# Patient Record
Sex: MALE | Race: WHITE | NOT HISPANIC OR LATINO | Employment: FULL TIME | URBAN - METROPOLITAN AREA
[De-identification: names, ages, dates, MRNs, and addresses within clinical notes are randomized per-mention and may not be internally consistent; named-entity substitution may affect disease eponyms.]

---

## 2024-07-01 ENCOUNTER — OFFICE VISIT (OUTPATIENT)
Dept: FAMILY MEDICINE | Facility: CLINIC | Age: 34
End: 2024-07-01
Payer: COMMERCIAL

## 2024-07-01 VITALS
RESPIRATION RATE: 20 BRPM | HEIGHT: 76 IN | TEMPERATURE: 98.9 F | BODY MASS INDEX: 35.02 KG/M2 | OXYGEN SATURATION: 97 % | HEART RATE: 86 BPM | WEIGHT: 287.6 LBS | DIASTOLIC BLOOD PRESSURE: 86 MMHG | SYSTOLIC BLOOD PRESSURE: 144 MMHG

## 2024-07-01 DIAGNOSIS — Z13.220 LIPID SCREENING: ICD-10-CM

## 2024-07-01 DIAGNOSIS — Z82.49 FAMILY HISTORY OF HEART DISEASE: ICD-10-CM

## 2024-07-01 DIAGNOSIS — Z13.1 SCREENING FOR DIABETES MELLITUS: ICD-10-CM

## 2024-07-01 DIAGNOSIS — Z11.59 NEED FOR HEPATITIS C SCREENING TEST: ICD-10-CM

## 2024-07-01 DIAGNOSIS — Z00.00 ROUTINE GENERAL MEDICAL EXAMINATION AT A HEALTH CARE FACILITY: Primary | ICD-10-CM

## 2024-07-01 DIAGNOSIS — R03.0 ELEVATED BLOOD PRESSURE READING WITHOUT DIAGNOSIS OF HYPERTENSION: ICD-10-CM

## 2024-07-01 DIAGNOSIS — Z11.4 SCREENING FOR HIV (HUMAN IMMUNODEFICIENCY VIRUS): ICD-10-CM

## 2024-07-01 PROCEDURE — 99385 PREV VISIT NEW AGE 18-39: CPT | Performed by: FAMILY MEDICINE

## 2024-07-01 SDOH — HEALTH STABILITY: PHYSICAL HEALTH: ON AVERAGE, HOW MANY DAYS PER WEEK DO YOU ENGAGE IN MODERATE TO STRENUOUS EXERCISE (LIKE A BRISK WALK)?: 2 DAYS

## 2024-07-01 ASSESSMENT — PAIN SCALES - GENERAL: PAINLEVEL: NO PAIN (0)

## 2024-07-01 ASSESSMENT — SOCIAL DETERMINANTS OF HEALTH (SDOH): HOW OFTEN DO YOU GET TOGETHER WITH FRIENDS OR RELATIVES?: ONCE A WEEK

## 2024-07-01 NOTE — PROGRESS NOTES
"Preventive Care Visit  [facility]  Abdelrahman Up MD, Family Medicine  Jul 1, 2024      Assessment & Plan     Routine general medical examination at a health care facility  Patient was advised on recommended screening and preventive health recommendations.  He verbalized understanding and agreed to the plans below.   He deferred covid vaccine to another day.    Family history of heart disease  Patient will obtain more information about this and let the care team know soon.  Possible tests that may be needed could include echocardiogram, cardiac event monitor, cardiac MRI or consult with cardiology.    Elevated blood pressure reading without diagnosis of hypertension  Patient was advised BP slightoly above normal range today.  No previous diagnosis of HTN.  Patient was advised risks of persistent and untreated BP elevation.  Patient was advised low salt diet.  Recheck BP 2-3 weeks with provider. If still >140/90, discuss treatment.     Lipid screening  - Lipid panel reflex to direct LDL Fasting    Screening for diabetes mellitus  - Glucose    Screening for HIV (human immunodeficiency virus)  - HIV Antigen Antibody Combo    Need for hepatitis C screening test  - Hepatitis C Screen Reflex to HCV RNA Quant and Genotype      Patient has been advised of split billing requirements and indicates understanding: Yes        BMI  Estimated body mass index is 34.78 kg/m  as calculated from the following:    Height as of this encounter: 1.937 m (6' 4.25\").    Weight as of this encounter: 130.5 kg (287 lb 9.6 oz).   Weight management plan: Discussed healthy diet and exercise guidelines    Counseling  Appropriate preventive services were discussed with this patient, including applicable screening as appropriate for fall prevention, nutrition, physical activity, Tobacco-use cessation, weight loss and cognition.  Checklist reviewing preventive services available has been given to the " "patient.  Reviewed patient's diet, addressing concerns and/or questions.   He is at risk for lack of exercise and has been provided with information to increase physical activity for the benefit of his well-being.   The patient was instructed to see the dentist every 6 months.   He is at risk for psychosocial distress and has been provided with information to reduce risk.       Patient Instructions   Patient Education    Be consistent with low salt, low trans fat and low saturated fat diet.  Eat food rich in omega-3-fatty acids as you tolerate. (salmon, olive oil)  Eat 5 cups of vegetables, fruits and whole grains per day.  Limit starchy food (white rice, white bread, white pasta, white potatoes) to less than a cup per meal.  Minimize sweets, junk food and fastfood. Limit soda beverages to one serving per day; best to avoid it altogether though.    Exercise: moderate intensity sustained for at least 30 mins per episode, goal of 150 mins per week at least  Combine cardiovascular and resistance exercises.  These exercise recommendations are in addition to your daily activity at work or home.  Work on losing weight.    Blood tests: be fasting for 8 hours or more on the next follow up.    Get covid19 vaccine updated soon. You deferred it to another day.       Preventive Care Advice   This is general advice we often give to help people stay healthy. Your care team may have specific advice just for you. Please talk to your care team about your own preventive care needs.  Lifestyle  Exercise at least 150 minutes each week (30 minutes a day, 5 days a week).  Do muscle strengthening activities 2 days a week. These help control your weight and prevent disease.  No smoking.  Wear sunscreen to prevent skin cancer.  Have your home tested for radon every 2 to 5 years. Radon is a colorless, odorless gas that can harm your lungs. To learn more, go to [email or web address].[email or web address] and search for \"Radon in Homes.\"  Keep guns unloaded and " "locked up in a safe place like a safe or gun vault, or, use a gun lock and hide the keys. Always lock away bullets separately. To learn more, visit dps.[place].gov and search for \"safe gun storage.\"  Nutrition  Eat 5 or more servings of fruits and vegetables each day.  Try wheat bread, brown rice and whole grain pasta (instead of white bread, rice, and pasta).  Get enough calcium and vitamin D. Check the label on foods and aim for 100% of the RDA (recommended daily allowance).  Regular exams  Have a dental exam and cleaning every 6 months.  See your health care team every year to talk about:  Any changes in your health.  Any medicines your care team has prescribed.  Preventive care, family planning, and ways to prevent chronic diseases.  Shots (vaccines)   HPV shots (up to age 26), if you've never had them before.  Hepatitis B shots (up to age 59), if you've never had them before.  COVID-19 shot: Get this shot when it's due.  Flu shot: Get a flu shot every year.  Tetanus shot: Get a tetanus shot every 10 years.  Pneumococcal, hepatitis A, and RSV shots: Ask your care team if you need these based on your risk.  Shingles shot (for age 50 and up).  General health tests  Diabetes screening:  Starting at age 35, Get screened for diabetes at least every 3 years.  If you are younger than age 35, ask your care team if you should be screened for diabetes.  Cholesterol test: At age 39, start having a cholesterol test every 5 years, or more often if advised.  Bone density scan (DEXA): At age 50, ask your care team if you should have this scan for osteoporosis (brittle bones).  Hepatitis C: Get tested at least once in your life.  Abdominal aortic aneurysm screening: Talk to your doctor about having this screening if you:  Have ever smoked; and  Are biologically male; and  Are between the ages of 65 and 75.  STIs (sexually transmitted infections)  Before age 24: Ask your care team if you should be screened for STIs.  After age 24: " Get screened for STIs if you're at risk. You are at risk for STIs (including HIV) if:  You are sexually active with more than one person.  You don't use condoms every time.  You or a partner was diagnosed with a sexually transmitted infection.  If you are at risk for HIV, ask about PrEP medicine to prevent HIV.  Get tested for HIV at least once in your life, whether you are at risk for HIV or not.  Cancer screening tests  Cervical cancer screening: If you have a cervix, begin getting regular cervical cancer screening tests at age 21. Most people who have regular screenings with normal results can stop after age 65. Talk about this with your provider.  Breast cancer scan (mammogram): If you've ever had breasts, begin having regular mammograms starting at age 40. This is a scan to check for breast cancer.  Colon cancer screening: It is important to start screening for colon cancer at age 45.  Have a colonoscopy test every 10 years (or more often if you're at risk) Or, ask your provider about stool tests like a FIT test every year or Cologuard test every 3 years.  To learn more about your testing options, visit: www.[employer]/208268.pdf.  For help making a decision, visit: marti/lk90352.  Prostate cancer screening test: If you have a prostate and are age 55 to 69, ask your provider if you would benefit from a yearly prostate cancer screening test.  Lung cancer screening: If you are a current or former smoker age 50 to 80, ask your care team if ongoing lung cancer screenings are right for you.  For informational purposes only. Not to replace the advice of your health care provider. Copyright   2023 [facility]. All rights reserved. Clinically reviewed by the [facility] Transitions Program. [employer] 892999 - REV 04/24.       Subjective   Sheldon is a 34 year old, presenting for the following:  Physical        7/1/2024     4:18 PM   Additional Questions   Roomed by Tootie ARREDONDO MA   Accompanied by  "self         2024     4:18 PM   Patient Reported Additional Medications   Patient reports taking the following new medications none        Health Care Directive  Patient does not have a Health Care Directive or Living Will: Discussed advance care planning with patient; however, patient declined at this time.    HPI    Brother  of a heart condition that is \"hereditary\". He does not know specific condition. Will need to get info from other fam members.  Patient denies chest pain, LIRA or palpitations.          2024   General Health   How would you rate your overall physical health? Good   Feel stress (tense, anxious, or unable to sleep) Only a little      (!) STRESS CONCERN      2024   Nutrition   Three or more servings of calcium each day? Yes   Diet: Regular (no restrictions)   How many servings of fruit and vegetables per day? (!) 0-1   How many sweetened beverages each day? 0-1            2024   Exercise   Days per week of moderate/strenous exercise 2 days      (!) EXERCISE CONCERN      2024   Social Factors   Frequency of gathering with friends or relatives Once a week   Worry food won't last until get money to buy more No   Food not last or not have enough money for food? No   Do you have housing? (Housing is defined as stable permanent housing and does not include staying ouside in a car, in a tent, in an abandoned building, in an overnight shelter, or couch-surfing.) Yes   Are you worried about losing your housing? No   Lack of transportation? No   Unable to get utilities (heat,electricity)? No            2024   Dental   Dentist two times every year? (!) NO            2024   TB Screening   Were you born outside of the [place]? No            Today's PHQ-2 Score:       2024     4:05 PM   PHQ-2 (  [employer])   Q1: Little interest or pleasure in doing things 0   Q2: Feeling down, depressed or hopeless 0   PHQ-2 Score 0   Q1: Little interest or pleasure in doing things Not at all " "  Q2: Feeling down, depressed or hopeless Not at all   PHQ-2 Score 0           7/1/2024   Substance Use   Alcohol more than 3/day or more than 7/wk No   Do you use any other substances recreationally? No        Social History     Tobacco Use    Smoking status: Former     Types: Cigarettes    Smokeless tobacco: Current    Tobacco comments:     Zyn pouches   Vaping Use    Vaping status: Never Used             7/1/2024   One time HIV Screening   Previous HIV test? No          7/1/2024   STI Screening   New sexual partner(s) since last STI/HIV test? No            7/1/2024   Contraception/Family Planning   Questions about contraception or family planning No           Reviewed and updated as needed this visit by Provider     Meds                No past medical history on file.  No past surgical history on file.  There is no problem list on file for this patient.    No past surgical history on file.    Social History     Tobacco Use    Smoking status: Former     Types: Cigarettes    Smokeless tobacco: Current    Tobacco comments:     Zyn pouches   Substance Use Topics    Alcohol use: Not on file     No family history on file.      No current outpatient medications on file.     No Known Allergies      Review of Systems  Constitutional, HEENT, cardiovascular, pulmonary, GI, , musculoskeletal, neuro, skin, endocrine and psych systems are negative, except as otherwise noted.     Objective    Exam  BP (!) 144/86   Pulse 86   Temp 98.9  F (37.2  C) (Tympanic)   Resp 20   Ht 1.937 m (6' 4.25\")   Wt 130.5 kg (287 lb 9.6 oz)   SpO2 97%   BMI 34.78 kg/m     Estimated body mass index is 34.78 kg/m  as calculated from the following:    Height as of this encounter: 1.937 m (6' 4.25\").    Weight as of this encounter: 130.5 kg (287 lb 9.6 oz).    Physical Exam  GENERAL APPEARANCE: obese, ambulatory w/o assist, alert and no distress  EYES: pink conj, no icterus, PERRL, EOMI  HENT: ear canals and TM's normal, nose and mouth " without ulcers or lesions, oropharynx clear and oral mucous membranes moist  NECK: no adenopathy, no asymmetry, masses, or scars and thyroid normal to palpation  RESP: lungs clear to auscultation - no rales, rhonchi or wheezes  CV: regular rates and rhythm, normal S1 S2, no S3 or S4, no murmur, click or rub, no peripheral edema and peripheral pulses strong  ABDOMEN: soft, nontender, no hepatosplenomegaly, no masses and bowel sounds normal  RECTAL: deferred  MS: no musculoskeletal defects are noted and gait is age appropriate without ataxia  SKIN: no suspicious lesions or rashes  NEURO: Normal strength and tone, sensory exam grossly normal, mentation intact and speech normal         Signed Electronically by: Abdelrahman Up MD

## 2024-07-01 NOTE — PATIENT INSTRUCTIONS
"Patient Education     Be consistent with low salt, low trans fat and low saturated fat diet.  Eat food rich in omega-3-fatty acids as you tolerate. (salmon, olive oil)  Eat 5 cups of vegetables, fruits and whole grains per day.  Limit starchy food (white rice, white bread, white pasta, white potatoes) to less than a cup per meal.  Minimize sweets, junk food and fastfood. Limit soda beverages to one serving per day; best to avoid it altogether though.    Exercise: moderate intensity sustained for at least 30 mins per episode, goal of 150 mins per week at least  Combine cardiovascular and resistance exercises.  These exercise recommendations are in addition to your daily activity at work or home.  Work on losing weight.    Blood tests: be fasting for 8 hours or more on the next follow up.    Get covid19 vaccine updated soon. You deferred it to another day.       Preventive Care Advice   This is general advice we often give to help people stay healthy. Your care team may have specific advice just for you. Please talk to your care team about your own preventive care needs.  Lifestyle  Exercise at least 150 minutes each week (30 minutes a day, 5 days a week).  Do muscle strengthening activities 2 days a week. These help control your weight and prevent disease.  No smoking.  Wear sunscreen to prevent skin cancer.  Have your home tested for radon every 2 to 5 years. Radon is a colorless, odorless gas that can harm your lungs. To learn more, go to www.health.[email or web address].mn. and search for \"Radon in Homes.\"  Keep guns unloaded and locked up in a safe place like a safe or gun vault, or, use a gun lock and hide the keys. Always lock away bullets separately. To learn more, visit [employer].mn.gov and search for \"safe gun storage.\"  Nutrition  Eat 5 or more servings of fruits and vegetables each day.  Try wheat bread, brown rice and whole grain pasta (instead of white bread, rice, and pasta).  Get enough calcium and vitamin D. Check the " label on foods and aim for 100% of the RDA (recommended daily allowance).  Regular exams  Have a dental exam and cleaning every 6 months.  See your health care team every year to talk about:  Any changes in your health.  Any medicines your care team has prescribed.  Preventive care, family planning, and ways to prevent chronic diseases.  Shots (vaccines)   HPV shots (up to age 26), if you've never had them before.  Hepatitis B shots (up to age 59), if you've never had them before.  COVID-19 shot: Get this shot when it's due.  Flu shot: Get a flu shot every year.  Tetanus shot: Get a tetanus shot every 10 years.  Pneumococcal, hepatitis A, and RSV shots: Ask your care team if you need these based on your risk.  Shingles shot (for age 50 and up).  General health tests  Diabetes screening:  Starting at age 35, Get screened for diabetes at least every 3 years.  If you are younger than age 35, ask your care team if you should be screened for diabetes.  Cholesterol test: At age 39, start having a cholesterol test every 5 years, or more often if advised.  Bone density scan (DEXA): At age 50, ask your care team if you should have this scan for osteoporosis (brittle bones).  Hepatitis C: Get tested at least once in your life.  Abdominal aortic aneurysm screening: Talk to your doctor about having this screening if you:  Have ever smoked; and  Are biologically male; and  Are between the ages of 65 and 75.  STIs (sexually transmitted infections)  Before age 24: Ask your care team if you should be screened for STIs.  After age 24: Get screened for STIs if you're at risk. You are at risk for STIs (including HIV) if:  You are sexually active with more than one person.  You don't use condoms every time.  You or a partner was diagnosed with a sexually transmitted infection.  If you are at risk for HIV, ask about PrEP medicine to prevent HIV.  Get tested for HIV at least once in your life, whether you are at risk for HIV or  not.  Cancer screening tests  Cervical cancer screening: If you have a cervix, begin getting regular cervical cancer screening tests at age 21. Most people who have regular screenings with normal results can stop after age 65. Talk about this with your provider.  Breast cancer scan (mammogram): If you've ever had breasts, begin having regular mammograms starting at age 40. This is a scan to check for breast cancer.  Colon cancer screening: It is important to start screening for colon cancer at age 45.  Have a colonoscopy test every 10 years (or more often if you're at risk) Or, ask your provider about stool tests like a FIT test every year or Cologuard test every 3 years.  To learn more about your testing options, visit: www.Netflix/426781.pdf.  For help making a decision, visit: marti/bw81310.  Prostate cancer screening test: If you have a prostate and are age 55 to 69, ask your provider if you would benefit from a yearly prostate cancer screening test.  Lung cancer screening: If you are a current or former smoker age 50 to 80, ask your care team if ongoing lung cancer screenings are right for you.  For informational purposes only. Not to replace the advice of your health care provider. Copyright   2023 [employer]. All rights reserved. Clinically reviewed by the [facility] Transitions Program. [employer] 193189 - REV 04/24.

## 2024-07-15 ENCOUNTER — OFFICE VISIT (OUTPATIENT)
Dept: FAMILY MEDICINE | Facility: CLINIC | Age: 34
End: 2024-07-15
Payer: COMMERCIAL

## 2024-07-15 VITALS
SYSTOLIC BLOOD PRESSURE: 150 MMHG | DIASTOLIC BLOOD PRESSURE: 96 MMHG | HEART RATE: 80 BPM | RESPIRATION RATE: 20 BRPM | OXYGEN SATURATION: 100 % | WEIGHT: 288.5 LBS | HEIGHT: 76 IN | BODY MASS INDEX: 35.13 KG/M2 | TEMPERATURE: 98.6 F

## 2024-07-15 DIAGNOSIS — I10 UNCONTROLLED HYPERTENSION: Primary | ICD-10-CM

## 2024-07-15 DIAGNOSIS — E78.2 MIXED HYPERLIPIDEMIA: ICD-10-CM

## 2024-07-15 DIAGNOSIS — R06.83 SNORING: ICD-10-CM

## 2024-07-15 DIAGNOSIS — Z11.59 NEED FOR HEPATITIS C SCREENING TEST: ICD-10-CM

## 2024-07-15 DIAGNOSIS — Z13.220 LIPID SCREENING: ICD-10-CM

## 2024-07-15 DIAGNOSIS — Z11.4 SCREENING FOR HIV (HUMAN IMMUNODEFICIENCY VIRUS): ICD-10-CM

## 2024-07-15 LAB
ANION GAP SERPL CALCULATED.3IONS-SCNC: 15 MMOL/L (ref 7–15)
BUN SERPL-MCNC: 12.6 MG/DL (ref 6–20)
CALCIUM SERPL-MCNC: 9.9 MG/DL (ref 8.6–10)
CHLORIDE SERPL-SCNC: 102 MMOL/L (ref 98–107)
CHOLEST SERPL-MCNC: 236 MG/DL
CREAT SERPL-MCNC: 0.77 MG/DL (ref 0.67–1.17)
EGFRCR SERPLBLD CKD-EPI 2021: >90 ML/MIN/1.73M2
ERYTHROCYTE [DISTWIDTH] IN BLOOD BY AUTOMATED COUNT: 11.7 % (ref 10–15)
FASTING STATUS PATIENT QL REPORTED: YES
FASTING STATUS PATIENT QL REPORTED: YES
GLUCOSE SERPL-MCNC: 94 MG/DL (ref 70–99)
HCO3 SERPL-SCNC: 25 MMOL/L (ref 22–29)
HCT VFR BLD AUTO: 46.2 % (ref 40–53)
HDLC SERPL-MCNC: 62 MG/DL
HGB BLD-MCNC: 15.1 G/DL (ref 13.3–17.7)
LDLC SERPL CALC-MCNC: 163 MG/DL
MCH RBC QN AUTO: 30 PG (ref 26.5–33)
MCHC RBC AUTO-ENTMCNC: 32.7 G/DL (ref 31.5–36.5)
MCV RBC AUTO: 92 FL (ref 78–100)
NONHDLC SERPL-MCNC: 174 MG/DL
PLATELET # BLD AUTO: 227 10E3/UL (ref 150–450)
POTASSIUM SERPL-SCNC: 4 MMOL/L (ref 3.4–5.3)
RBC # BLD AUTO: 5.03 10E6/UL (ref 4.4–5.9)
SODIUM SERPL-SCNC: 142 MMOL/L (ref 135–145)
TRIGL SERPL-MCNC: 55 MG/DL
TSH SERPL DL<=0.005 MIU/L-ACNC: 3.02 UIU/ML (ref 0.3–4.2)
WBC # BLD AUTO: 8.6 10E3/UL (ref 4–11)

## 2024-07-15 PROCEDURE — 80061 LIPID PANEL: CPT | Performed by: FAMILY MEDICINE

## 2024-07-15 PROCEDURE — 85027 COMPLETE CBC AUTOMATED: CPT | Performed by: FAMILY MEDICINE

## 2024-07-15 PROCEDURE — 86803 HEPATITIS C AB TEST: CPT | Performed by: FAMILY MEDICINE

## 2024-07-15 PROCEDURE — 87086 URINE CULTURE/COLONY COUNT: CPT | Performed by: FAMILY MEDICINE

## 2024-07-15 PROCEDURE — 99214 OFFICE O/P EST MOD 30 MIN: CPT | Performed by: FAMILY MEDICINE

## 2024-07-15 PROCEDURE — 87389 HIV-1 AG W/HIV-1&-2 AB AG IA: CPT | Performed by: FAMILY MEDICINE

## 2024-07-15 PROCEDURE — 80048 BASIC METABOLIC PNL TOTAL CA: CPT | Performed by: FAMILY MEDICINE

## 2024-07-15 PROCEDURE — 84443 ASSAY THYROID STIM HORMONE: CPT | Performed by: FAMILY MEDICINE

## 2024-07-15 PROCEDURE — 93000 ELECTROCARDIOGRAM COMPLETE: CPT | Performed by: FAMILY MEDICINE

## 2024-07-15 PROCEDURE — 36415 COLL VENOUS BLD VENIPUNCTURE: CPT | Performed by: FAMILY MEDICINE

## 2024-07-15 RX ORDER — LISINOPRIL 5 MG/1
5 TABLET ORAL DAILY
Qty: 90 TABLET | Refills: 0 | Status: SHIPPED | OUTPATIENT
Start: 2024-07-15 | End: 2024-09-25

## 2024-07-15 ASSESSMENT — PAIN SCALES - GENERAL: PAINLEVEL: NO PAIN (0)

## 2024-07-15 NOTE — PROGRESS NOTES
Assessment & Plan     Uncontrolled hypertension  Discussed causes, course, complications and treatment of condition.  Low salt, low fat diet.   Exercise as tolerated 30 mins per day 3 days a week to start with.  Counseled on different meds; patient agreed to above recommended med.  Take meds as prescribed; call if with side effects.   Nurse visit in 2 weeks for recheck.  Return precautions given.   - Basic metabolic panel  - CBC with platelets  - TSH with free T4 reflex  - Urine Culture  - lisinopril (ZESTRIL) 5 MG tablet  Dispense: 90 tablet; Refill: 0  - EKG 12-lead complete w/read - Clinics  - Adult Sleep Eval & Management  Referral    Snoring  Advised possible ROBERT. Sleep apnea if present may also contribute to high BP.  - Adult Sleep Eval & Management  Referral              Patient Instructions   Your blood pressure today is uncontrolled.  Due to this medical treatment should be started to prevent complications.  Start lisinopril 5 mg today.  Blood, urine, and EKG tests are ordered as baseline.  Low salt, low fat diet. Exercise as tolerated 30 mins per day 3 days a week.  Take meds as prescribed; call if with side effects.     Referral to sleep clinic has been signed. Schedulers will call you in the next 3-5 business days.       Subjective   Sheldon is a 34 year old, presenting for the following health issues:  Hypertension (Follow up) and Sleep Problem        7/15/2024     3:09 PM   Additional Questions   Roomed by Tootie ARREDONDO MA   Accompanied by Self         7/15/2024     3:09 PM   Patient Reported Additional Medications   Patient reports taking the following new medications Men's one a day multivitamin     History of Present Illness       Hypertension: He presents for follow up of hypertension.  He does not check blood pressure  regularly outside of the clinic. Outside blood pressures have been over 140/90. He follows a low salt diet.     He eats 2-3 servings of fruits and vegetables  "daily.He consumes 0 sweetened beverage(s) daily.He exercises with enough effort to increase his heart rate 10 to 19 minutes per day.  He exercises with enough effort to increase his heart rate 3 or less days per week.   He is taking medications regularly.       Patient said his brother had atherosclerosis and passed away of a heart event 10 years ago.    Patient is fasting today for blood test.    Denies chest pain, dyspnea, HA, BOV, dizziness or urinary changes.     Patient asked for sleep clinic referral as his spouse to ld him he snores. Patient denies significant daytime fatigue.    Review of Systems  Constitutional, HEENT, cardiovascular, pulmonary, GI, , musculoskeletal, neuro, skin, endocrine and psych systems are negative, except as otherwise noted.      Objective    BP (!) 150/96   Pulse 80   Temp 98.6  F (37  C) (Tympanic)   Resp 20   Ht 1.937 m (6' 4.25\")   Wt 130.9 kg (288 lb 8 oz)   SpO2 100%   BMI 34.89 kg/m    Body mass index is 34.89 kg/m .  Physical Exam   GENERAL: alert and no distress, ambulatory w/o assist  NECK: no tenderness, no adenopathy,  Thyroid not enlarged  RESP: lungs clear to auscultation - no rales, no rhonchi, no wheezes  CV: regular rates and rhythm, no murmur  MS: no edema  SKIN: no suspicious lesions, no rashes      EKG today showed sinus rhythm with no ST-T changes to suspect ischemia, no LVH by voltage criteria, no previous tracing to compare with    Signed Electronically by: Abdelrahman Up MD    "

## 2024-07-15 NOTE — PATIENT INSTRUCTIONS
Your blood pressure today is uncontrolled.  Due to this medical treatment should be started to prevent complications.  Start lisinopril 5 mg today.  Blood, urine, and EKG tests are ordered as baseline.  Low salt, low fat diet. Exercise as tolerated 30 mins per day 3 days a week.  Take meds as prescribed; call if with side effects.     Referral to sleep clinic has been signed. Schedulers will call you in the next 3-5 business days.

## 2024-07-16 LAB
HCV AB SERPL QL IA: NONREACTIVE
HIV 1+2 AB+HIV1 P24 AG SERPL QL IA: NONREACTIVE

## 2024-07-17 ENCOUNTER — MYC MEDICAL ADVICE (OUTPATIENT)
Dept: FAMILY MEDICINE | Facility: CLINIC | Age: 34
End: 2024-07-17
Payer: COMMERCIAL

## 2024-07-17 DIAGNOSIS — I10 UNCONTROLLED HYPERTENSION: Primary | ICD-10-CM

## 2024-07-17 DIAGNOSIS — E78.2 MIXED HYPERLIPIDEMIA: ICD-10-CM

## 2024-07-17 DIAGNOSIS — Z82.49 FAMILY HISTORY OF HEART DISEASE: ICD-10-CM

## 2024-07-17 LAB — BACTERIA UR CULT: NO GROWTH

## 2024-07-18 ENCOUNTER — OFFICE VISIT (OUTPATIENT)
Dept: SLEEP MEDICINE | Facility: CLINIC | Age: 34
End: 2024-07-18
Attending: FAMILY MEDICINE
Payer: COMMERCIAL

## 2024-07-18 VITALS
HEIGHT: 76 IN | RESPIRATION RATE: 18 BRPM | BODY MASS INDEX: 34.46 KG/M2 | WEIGHT: 283 LBS | HEART RATE: 89 BPM | OXYGEN SATURATION: 98 % | DIASTOLIC BLOOD PRESSURE: 81 MMHG | SYSTOLIC BLOOD PRESSURE: 144 MMHG

## 2024-07-18 DIAGNOSIS — R06.83 SNORING: ICD-10-CM

## 2024-07-18 DIAGNOSIS — R06.81 WITNESSED EPISODE OF APNEA: Primary | ICD-10-CM

## 2024-07-18 DIAGNOSIS — I10 UNCONTROLLED HYPERTENSION: ICD-10-CM

## 2024-07-18 PROCEDURE — 99203 OFFICE O/P NEW LOW 30 MIN: CPT | Performed by: INTERNAL MEDICINE

## 2024-07-18 ASSESSMENT — SLEEP AND FATIGUE QUESTIONNAIRES
HOW LIKELY ARE YOU TO NOD OFF OR FALL ASLEEP WHILE SITTING AND TALKING TO SOMEONE: WOULD NEVER DOZE
HOW LIKELY ARE YOU TO NOD OFF OR FALL ASLEEP WHILE SITTING INACTIVE IN A PUBLIC PLACE: WOULD NEVER DOZE
HOW LIKELY ARE YOU TO NOD OFF OR FALL ASLEEP WHEN YOU ARE A PASSENGER IN A CAR FOR AN HOUR WITHOUT A BREAK: SLIGHT CHANCE OF DOZING
HOW LIKELY ARE YOU TO NOD OFF OR FALL ASLEEP WHILE SITTING QUIETLY AFTER LUNCH WITHOUT ALCOHOL: SLIGHT CHANCE OF DOZING
HOW LIKELY ARE YOU TO NOD OFF OR FALL ASLEEP WHILE SITTING AND READING: SLIGHT CHANCE OF DOZING
HOW LIKELY ARE YOU TO NOD OFF OR FALL ASLEEP WHILE LYING DOWN TO REST IN THE AFTERNOON WHEN CIRCUMSTANCES PERMIT: MODERATE CHANCE OF DOZING
HOW LIKELY ARE YOU TO NOD OFF OR FALL ASLEEP WHILE WATCHING TV: SLIGHT CHANCE OF DOZING
HOW LIKELY ARE YOU TO NOD OFF OR FALL ASLEEP IN A CAR, WHILE STOPPED FOR A FEW MINUTES IN TRAFFIC: WOULD NEVER DOZE

## 2024-07-18 NOTE — PROGRESS NOTES
[facility] [address] [address]  Phone: [phone]    Patient:  Sheldon Velez, Date of birth 1990  Date of Visit:  07/18/2024  Referring Provider Abdelrahman Up             [facility] [place]   Outpatient Sleep Medicine Consultation  July 18, 2024      Name: Sheldon Velez MRN# [id number]   Age: 34 year old YOB: 1990     Date of Consultation: July 18, 2024  Consultation is requested by: Abdelrahman Up MD  [address] [place] [address] Abdelrahman pU  Primary care provider: No Ref-Primary, Physician         Reason for Sleep Consult:     Sheldon Velez is a 34 year old male for complaints of reported snoring, interrupted and poor sleep for over 10 years with difficult to control hypertension.         Assessment and Plan:   34 years old male with underlying history of hypertension, class II obesity is seen for his longstanding history of snoring, interrupted and poor sleep and subjective daytime tiredness.  He has been diagnosed with hypertension and his primary healthcare provider is concerned for untreated sleep disordered breathing as a contributing factor towards his difficult to control hypertension.  Summary Sleep Diagnoses:    Sleep disordered breathing. Kush's history and clinical presentation is highly suggestive with a high pretest probability for obstructive sleep apnea.  In the absence of any significant cardiovascular or pulmonary comorbidities a type III home sleep test is a reasonable diagnostic study to determine the presence and severity of obstructive sleep apnea.  For sleep.  He does complain of subjective tiredness during the daytime and is not content with the quality of his sleep at night with intermittent arousals.  Insomnia.  He does report of some difficulty in falling asleep which is worse on Sunday nights.  Usually it can take  him between 30 minutes to an hour to fall asleep during his workweek which is contributing to its his chronically restricted sleep pattern.  There is some compensation over the weekend which is resulting in increasing sleep latency particularly on Sunday nights.      Comorbid Diagnoses:    Hypertension.  Class II obesity.    Summary Recommendations:    NOx T3 home sleep test to screen for the presence and determine the severity of obstructive sleep apnea.  Sleep hygiene is recommended.  Patient is advised to maintain a consistent bedtime which is closer to his intrinsic circadian rhythm.  His usual sleep time is around 11 PM.  He is advised not to operate heavy machinery or drive when feeling drowsy.    Summary Counseling:  See instructions    Counseling included a comprehensive review of diagnostic and therapeutic strategies as well as risks of inadequate therapy.  Educational materials provided in instructions.             History of Present Illness:     Sheldon Velez is a 34 year old male with history of hypertension has been sent by his primary healthcare provider with concern for untreated obstructive sleep apnea/sleep disordered breathing as a likely contributor to 2 words is difficult to manage hypertension. Kush is  with a 16-month-old child.  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  During the workweek he attempts to go to bed around 9:30 PM.  He has been struggling with laying in his bed sometimes up to an hour before falling asleep.  During the night he does snore as per his wife who has witnessed pauses in his breathing.  He usually wakes up between 1-2 times during the night tossing or turning.  He wakes up with an alarm around 5:30 in the morning and on most days does not feel well rested.  Over the weekend he will go to bed around 11 PM and able to take him less than 30 minutes to fall asleep.  He wakes up without an  alarm around 7:30 AM.  He does take 1-2 naps in a week usually over the weekend which usually last between 60 to 90 minutes.    Kush does have occasional episodes of hypnopompic hallucinations but denies symptoms of sleep paralysis, cataplexy.  He denies symptoms of orthopnea or paroxysmal nocturnal dyspnea.    SLEEP-WAKE SCHEDULE:     Sheldon Velez     -Describes themself as a morning person;  prefer to go to sleep at 11:00 PM and wake up at 7:30 AM.     -Naps 1-2 times per week for  minutes, feels refreshed after naps; takes no inadvertant naps.     Sheldon Velez    -ON WEEKDAYS, goes to sleep at 9:30 PM during the week; awakens  5:30 AM with an alarm; falls asleep in 60 minutes; has difficulty falling asleep.     -ON WEEKENDS, goes to sleep at 11:00 PM.  He wakes up at 7:30 AM without an alarm; falls asleep in 30 minutes.       -Awakens 1-2 times a night for 5 minutes before falling back to sleep; awakens to uncertain reasons with social jet lag of 1 hours.      He does not feel rested in the morning.    SCALES       SLEEP APNEA: [place] score: 7         INSOMNIA:  Insomnia severity score: 13       SLEEPINESS: [place] sleepiness scale: 6 [normal < 11]   Drowsy driving/near accidents: Denies.  Consequences: None      SLEEP COMPLAINTS:  Cardio-respiratory    Snoring-: Snores loudly as per his wife, nightly/week  Dyspnea-: Denies  Morning headaches or confusion: Denies  Coexisting Lung disease: Denies    Coexisting Heart disease: Denies    Does patient have a bed partner: Sleeps with his wife  Has bed partner been sleeping separately because of snoring denies            RLS Screen: When you try to relax in the evening or sleep at  night, do you ever have unpleasant, restless feelings in your  legs that can be relieved by walking or movement?  Denies    Periodic limb movement: Denies    Narcolepsy: Denies sudden urges of sleep attacks  Cataplexy: Denies   Sleep paralysis: Denies    Hallucinations:  Denies       Sleep Behaviors:  Leg symptoms/movements: Denies  Motor restlessness: Denies  Night terrors: Denies  Bruxism: Denies  Automatic behaviors: None    Other subjective complaints:  Anxiety or rumination: Denies  Pain and discomfort at  night: Denies  Waking up with heart pounding or racing denies  GERD or aspiration: Denies         Parasomnia:   NREM: Denies recurrent persistent confusional arousal, night eating, sleep walking or sleep terrors   REM: Denies dream enactment; injuries          Medications:     Current Outpatient Medications   Medication Sig Dispense Refill    lisinopril (ZESTRIL) 5 MG tablet Take 1 tablet (5 mg) by mouth daily 90 tablet 0     No current facility-administered medications for this visit.        No Known Allergies         Past Medical History:     Does not need 02 supplement at night   No past medical history on file.          Past Surgical History:    Previous upper airway surgery: Denies  No past surgical history on file.         Social History:     Social History     Tobacco Use    Smoking status: Former     Types: Cigarettes    Smokeless tobacco: Current    Tobacco comments:     Alexys valencia   Substance Use Topics    Alcohol use: Not on file         Chemical History:     Tobacco: Quit smoking 2 years ago (7 pack years)      Uses 2 cups/day of coffee. Last caffeine intake is usually before noon    Supplements for wakefulness: Does not use any supplements.    EtOH: 5-6 beers   Recreational Drugs: Denies    Psych Hx:   No known psychiatric history  Current dangers to self or others:none           Family History:     No family history on file.     Sleep Family Hx:        RLS-none   ROBERT -2 brothers have diagnosis of obstructive sleep apnea  Insomnia -unknown  Parasomnia -none         Review of Systems:     A complete 10 point review of systems was negative other than HPI or as commented below:   Sheldon Velez has gained 0-5 pounds in the last year.      CONSTITUTIONAL:  "NEGATIVE for weight gain/loss, fever, chills, sweats or night sweats, drug allergies.  EYES: NEGATIVE for changes in vision, blind spots, double vision.  ENT: NEGATIVE for ear pain, sore throat, sinus pain, post-nasal drip, runny nose, bloody nose  CARDIAC: NEGATIVE for fast heartbeats or fluttering in chest, chest pain or pressure, breathlessness when lying flat, swollen legs or swollen feet.  NEUROLOGIC: NEGATIVE headaches, weakness or numbness in the arms or legs.  DERMATOLOGIC: NEGATIVE for rashes, new moles or change in mole(s)  PULMONARY: NEGATIVE SOB at rest, SOB with activity, dry cough, productive cough, coughing up blood, wheezing or whistling when breathing.    GASTROINTESTINAL: NEGATIVE for nausea or vomitting, loose or watery stools, fat or grease in stools, constipation, abdominal pain, bowel movements black in color or blood noted.  GENITOURINARY: NEGATIVE for pain during urination, blood in urine, urinating more frequently than usual, irregular menstrual periods.  MUSCULOSKELETAL: NEGATIVE for muscle pain, bone or joint pain, swollen joints.  ENDOCRINE: NEGATIVE for increased thirst or urination, diabetes.  LYMPHATIC: NEGATIVE for swollen lymph nodes, lumps or bumps in the breasts or nipple discharge.         Physical Examination:     BP (!) 145/84   Pulse 89   Resp 18   Ht 1.93 m (6' 4\")   Wt 128.4 kg (283 lb)   SpO2 98%   BMI 34.45 kg/m    GENERAL APPEARANCE: Well-groomed appearance, appropriately engaged throughout the interview,alert, oriented, no apparent distress  EYES: Eyes grossly normal to inspection,lids and lashes within normal limits   HENT: Mallampatti 4, Carriz[place] class IV; mandibular profile mild micrognathia; dentition normal  NECK: Supple, no adenopathy,no asymmetry,   RESP: Breathing comfortably, no apparent use of accessory respiratory muscles, no audible wheezes  MUSCULOSKELETAL: Normal extremities; no deformities noted  NEURO: Normal gait; intact cranial nerves; grossly " "normal strength  PSYCH: Normal mentation; normal affect   SKIN: No hyperpigmented skin changes over exposed areas            Data: All pertinent previous laboratory data reviewed     No results found for: \"PH\", \"PHARTERIAL\", \"PO2\", \"[id number]\", \"SAT\", \"PCO2\", \"HCO3\", \"BASEEXCESS\", \"SAL\", \"BEB\"  Lab Results   Component Value Date    TSH 3.02 07/15/2024     Lab Results   Component Value Date    GLC 94 07/15/2024     Lab Results   Component Value Date    HGB 15.1 07/15/2024     Lab Results   Component Value Date    BUN 12.6 07/15/2024    CR 0.77 07/15/2024     Copy to: No Ref-Primary, Physician      Rene Figueroa MD 7/18/2024     Total of 37 minutes of time was spent with patient, this includes the interview and exam, and review of the chart/labs/imaging/sleep study/PAP therapy and pertinent clinical data on 07/18/2024. This also includes (greater than 50%) time spent counseling and coordinating care.              "

## 2024-07-18 NOTE — PATIENT INSTRUCTIONS
Your BMI is Body mass index is 34.45 kg/m .    What is BMI?  Body mass index (BMI) is one way to tell whether you are at a healthy weight, overweight, or obese. It measures your weight in relation to your height.  A BMI of 18.5 to 24.9 is in the healthy range. A person with a BMI of 25 to 29.9 is considered overweight, and someone with a BMI of 30 or greater is considered obese.  Another way to find out if you are at risk for health problems caused by overweight and obesity is to measure your waist. If you are a woman and your waist is more than 35 inches, or if you are a man and your waist is more than 40 inches, your risk of disease may be higher.  More than two-thirds of American adults are considered overweight or obese. Being overweight or obese increases the risk for further weight gain.  Excess weight may lead to heart disease and diabetes. Creating and following plans for healthy eating and physical activity may help you improve your health.    Methods for maintaining or losing weight.  Weight control is part of healthy lifestyle and includes exercise, emotional health, and healthy eating habits.  Careful eating habits lifelong is the mainstay of weight control.  Though there are significant health benefits from weight loss, long-term weight loss with diet alone may be very difficult to achieve- studies show long-term success with dietary management in less than 10% of people. Attaining a healthy weight may be especially difficult to achieve in those with severe obesity. In some cases, medications, devices and surgical management might be considered.    What can you do?  If you are overweight or obese and are interested in methods for weight loss, you should discuss this with your provider. In addition, we recommend that you review healthy life styles and methods for weight loss available through the National Institutes of Health patient information sites:  "  http://win.niddk.nih.gov/publications/index.htm      Your blood pressure was checked while you were in clinic today.  Please read the guidelines below about what these numbers mean and what you should do about them.  Your systolic blood pressure is the top number.  This is the pressure when the heart is pumping.  Your diastolic blood pressure is the bottom number.  This is the pressure in between beats.  If your systolic blood pressure is less than 120 and your diastolic blood pressure is less than 80, then your blood pressure is normal. There is nothing more that you need to do about it  If your systolic blood pressure is 120-139 or your diastolic blood pressure is 80-89, your blood pressure may be higher than it should be.  You should have your blood pressure re-checked within a year by a primary care provider.  If your systolic blood pressure is 140 or greater or your diastolic blood pressure is 90 or greater, you may have high blood pressure.  High blood pressure is treatable, but if left untreated over time it can put you at risk for heart attack, stroke, or kidney failure.  You should have your blood pressure re-checked by a primary care provider within the next four weeks.            MY TREATMENT INFORMATION FOR SLEEP APNEA-  Sheldon Velez    DOCTOR : Rene Figueroa MD    Am I having a sleep study at a sleep center?  --->Due to normal delays, you will be contacted within 2-4 weeks to schedule    Am I having a home sleep study?  --->Watch the video for the device you are using:    -/drop off device-   https://www.[employer].com/watch?v=yGGFBdELGhk    -Disposable device sent out require phone/computer application-   https://www.[employer].com/watch?v=BCce_vbiwxE      Frequently asked questions:  1. What is Obstructive Sleep Apnea (ROBERT)? ROBERT is the most common type of sleep apnea. Apnea means, \"without breath.\"  Apnea is most often caused by narrowing or collapse of the upper airway as muscles relax " during sleep.   Almost everyone has occasional apneas. Most people with sleep apnea have had brief interruptions at night frequently for many years.  The severity of sleep apnea is related to how frequent and severe the events are.   2. What are the consequences of ROBERT? Symptoms include: feeling sleepy during the day, snoring loudly, gasping or stopping of breathing, trouble sleeping, and occasionally morning headaches or heartburn at night.  Sleepiness can be serious and even increase the risk of falling asleep while driving. Other health consequences may include development of high blood pressure and other cardiovascular disease in persons who are susceptible. Untreated ROBERT  can contribute to heart disease, stroke and diabetes.   3. What are the treatment options? In most situations, sleep apnea is a lifelong disease that must be managed with daily therapy. Medications are not effective for sleep apnea and surgery is generally not considered until other therapies have been tried. Your treatment is your choice . Continuous Positive Airway (CPAP) works right away and is the therapy that is effective in nearly everyone. An oral device to hold your jaw forward is usually the next most reliable option. Other options include postioning devices (to keep you off your back), weight loss, and surgery including a tongue pacing device. There is more detail about some of these options below.  4. Are my sleep studies covered by insurance? Although we will request verification of coverage, we advise you also check in advance of the study to ensure there is coverage.    Important tips for those choosing CPAP and similar devices  REMEMBER-IF YOU RECEIVE A CALL FROM  [phone]-->IT IS TO SETUP A DEVICE  For new devices, sign up for device LEVI to monitor your device for your followup visits  We encourage you to utilize the [employer] levi or website ( https://[email or web address] ) to monitor your therapy progress and share the  data with your healthcare team when you discuss your sleep apnea.                                                    Know your equipment:  CPAP is continuous positive airway pressure that prevents obstructive sleep apnea by keeping the throat from collapsing while you are sleeping. In most cases, the device is  smart  and can slowly self-adjusts if your throat collapses and keeps a record every day of how well you are treated-this information is available to you and your care team.  BPAP is bilevel positive airway pressure that keeps your throat open and also assists each breath with a pressure boost to maintain adequate breathing.  Special kinds of BPAP are used in patients who have inadequate breathing from lung or heart disease. In most cases, the device is  smart  and can slowly self-adjusts to assist breathing. Like CPAP, the device keeps a record of how well you are treated.  Your mask is your connection to the device. You get to choose what feels most comfortable and the staff will help to make sure if fits. Here: are some examples of the different masks that are available: Magnetic mask aids may assist with use but there are safety issues that should be addressed when considering with magnets* ( see end of discussion).       Key points to remember on your journey with sleep apnea:  Sleep study.  PAP devices often need to be adjusted during a sleep study to show that they are effective and adjusted right.  Good tips to remember: Try wearing just the mask during a quiet time during the day so your body adapts to wearing it. A humidifier is recommended for comfort in most cases to prevent drying of your nose and throat. Allergy medication from your provider may help you if you are having nasal congestion.  Getting settled-in. It takes more than one night for most of us to get used to wearing a mask. Try wearing just the mask during a quiet time during the day so your body adapts to wearing it. A humidifier is  recommended for comfort in most cases. Our team will work with you carefully on the first day and will be in contact within 4 days and again at 2 and 4 weeks for advice and remote device adjustments. Your therapy is evaluated by the device each day.   Use it every night. The more you are able to sleep naturally for 7-8 hours, the more likely you will have good sleep and to prevent health risks or symptoms from sleep apnea. Even if you use it 4 hours it helps. Occasionally all of us are unable to use a medical therapy, in sleep apnea, it is not dangerous to miss one night.   Communicate. Call our skilled team on the number provided on the first day if your visit for problems that make it difficult to wear the device. Over 2 out of 3 patients can learn to wear the device long-term with help from our team. Remember to call our team or your sleep providers if you are unable to wear the device as we may have other solutions for those who cannot adapt to mask CPAP therapy. It is recommended that you sleep your sleep provider within the first 3 months and yearly after that if you are not having problems.   Use it for your health. We encourage use of CPAP masks during daytime quiet periods to allow your face and brain to adapt to the sensation of CPAP so that it will be a more natural sensation to awaken to at night or during naps. This can be very useful during the first few weeks or months of adapting to CPAP though it does not help medically to wear CPAP during wakefulness and  should not be used as a strategy just to meet guidelines.  Take care of your equipment. Make sure you clean your mask and tubing using directions every day and that your filter and mask are replaced as recommended or if they are not working.     *Masks with magnets:  Updated Contraindications  Masks with magnetic components are contraindicated for use by patients where they, or anyone in close physical contact while using the mask, have the  following:   Active medical implants that interact with magnets (i.e., pacemakers, implantable cardioverter defibrillators (ICD), neurostimulators, cerebrospinal fluid (CSF) shunts, insulin/infusion pumps)   Metallic implants/objects containing ferromagnetic material (i.e., aneurysm clips/flow disruption devices, embolic coils, stents, valves, electrodes, implants to restore hearing or balance with implanted magnets, ocular implants, metallic splinters in the eye)  Updated Warning  Keep the mask magnets at a safe distance of at least 6 inches (150 mm) away from implants or medical devices that may be adversely affected by magnetic interference. This warning applies to you or anyone in close physical contact with your mask. The magnets are in the frame and lower headgear clips, with a magnetic field strength of up to 400mT. When worn, they connect to secure the mask but may inadvertently detach while asleep.  Implants/medical devices, including those listed within contraindications, may be adversely affected if they change function under external magnetic fields or contain ferromagnetic materials that attract/repel to magnetic fields (some metallic implants, e.g., contact lenses with metal, dental implants, metallic cranial plates, screws, hyacinth hole covers, and bone substitute devices). Consult your physician and  of your implant / other medical device for information on the potential adverse effects of magnetic fields.    BESIDES CPAP, WHAT OTHER THERAPIES ARE THERE?    Positioning Device  Positioning devices are generally used when sleep apnea is mild and only occurs on your back.This example shows a pillow that straps around the waist. It may be appropriate for those whose sleep study shows milder sleep apnea that occurs primarily when lying flat on one's back. Preliminary studies have shown benefit but effectiveness at home may need to be verified by a home sleep test. These devices are generally not  covered by medical insurance.  Examples of devices that maintain sleeping on the back to prevent snoring and mild sleep apnea.    Belt type body positioner  http://[employer]/    Electronic reminder  http://nightshifttherapy.com/            Oral Appliance  What is oral appliance therapy?  An oral appliance device fits on your teeth at night like a retainer used after having braces. The device is made by a specialized dentist and requires several visits over 1-2 months before a manufactured device is made to fit your teeth and is adjusted to prevent your sleep apnea. Once an oral device is working properly, snoring should be improved. A home sleep test may be recommended at that time if to determine whether the sleep apnea is adequately treated.       Some things to remember:  -Oral devices are often, but not always, covered by your medical insurance. Be sure to check with your insurance provider.   -If you are referred for oral therapy, you will be given a list of specialized dentists to consider or you may choose to visit the Web site of the American Academy of Dental Sleep Medicine  -Oral devices are less likely to work if you have severe sleep apnea or are extremely overweight.     More detailed information  An oral appliance is a small acrylic device that fits over the upper and lower teeth  (similar to a retainer or a mouth guard). This device slightly moves jaw forward, which moves the base of the tongue forward, opens the airway, improves breathing for effective treat snoring and obstructive sleep apnea in perhaps 7 out of 10 people .  The best working devices are custom-made by a dental device  after a mold is made of the teeth 1, 2, 3.  When is an oral appliance indicated?  Oral appliance therapy is recommended as a first-line treatment for patients with primary snoring, mild sleep apnea, and for patients with moderate sleep apnea who prefer appliance therapy to use of CPAP4, 5. Severity of  sleep apnea is determined by sleep testing and is based on the number of respiratory events per hour of sleep.   How successful is oral appliance therapy?  The success rate of oral appliance therapy in patients with mild sleep apnea is 75-80% while in patients with moderate sleep apnea it is 50-70%. The chance of success in patients with severe sleep apnea is 40-50%. The research also shows that oral appliances have a beneficial effect on the cardiovascular health of ROBERT patients at the same magnitude as CPAP therapy7.  Oral appliances should be a second-line treatment in cases of severe sleep apnea, but if not completely successful then a combination therapy utilizing CPAP plus oral appliance therapy may be effective. Oral appliances tend to be effective in a broad range of patients although studies show that the patients who have the highest success are females, younger patients, those with milder disease, and less severe obesity. 3, 6.   Finding a dentist that practices dental sleep medicine  Specific training is available through the American Academy of Dental Sleep Medicine for dentists interested in working in the field of sleep. To find a dentist who is educated in the field of sleep and the use of oral appliances, near you, visit the Web site of the American Academy of Dental Sleep Medicine.    References  1. Jonny, et al. Objectively measured vs self-reported compliance during oral appliance therapy for sleep-disordered breathing. Chest 2013; 144(5): 1308-1755.  2. Anne et al. Objective measurement of compliance during oral appliance therapy for sleep-disordered breathing. Thorax 2013; 68(1): 91-96.  3. Brenda, et al. Mandibular advancement devices in 620 men and women with ROBERT and snoring: tolerability and predictors of treatment success. Chest 2004; 125: 0881-2296.  4. Jameson et al. Oral appliances for snoring and ROBERT: a review. Sleep 2006; 29: 244-262.  5. Tanisha et al. Oral  appliance treatment for ROBERT: an update. J Clin Sleep Med 2014; 10(2): 215-227.  6. Bi et al. Predictors of OSAH treatment outcome. J Dent Res 2007; 86: 9734-1360.      Weight Loss:   Your Body mass index is 34.45 kg/m .    Being overweight does not necessarily mean you will have health consequences.  Those who have BMI over 35 or over 27 with existing medical conditions carries greater risk.   Weight loss decreases severity of sleep apnea in most people with obesity. For those with mild obesity who have developed snoring with weight gain, even 15-30 pound weight loss can improve and occasionally milder eliminate sleep apnea.  Structured and life-long dietary and health habits are necessary to lose weight and keep healthier weight levels.     The Comprehensive Weight loss program offers all aspects of weight loss strategies including two Non-Surgical Weight Loss Programs: Medical Weight Management and our 24 Week Healthy Lifestyle Program:    Medical Weight Management: You will meet with a Medical Weight Management Provider, as well as a Registered Dietician. The program may include medication therapy, dietary education, recommended exercise and physical therapy programs, monthly support group meetings, and possible psychological counseling. Follow up visits with the provider or dietician are scheduled based on your progress and needs.    24 Week Healthy Lifestyle Program: This unique program is designed to give you the support of weekly appointments and activities thru a 24-week period. It may include all of the components of the basic program (above), with the addition of 11 individual Health  Visits, 24-week access to the [employer] website for over 700 online classes, and monthly support group meetings. This program has an out-of-pocket expense of $499 to cover the items that can not be billed to insurance (health coaches and [employer] access), and is non-refundable/non-transferable (you may be able to  use a Health Savings Account; ask your HSA provider). There may be an optional meal replacement plan prescribed as well.   Surgical management achieves meaningful long-term weight loss and improvement in health risks in most patients with more severe obesity.      Sleep Apnea Surgery:    Surgery for obstructive sleep apnea is considered generally only when other therapies fail to work. Surgery may be discussed with you if you are having a difficult time tolerating CPAP and or when there is an abnormal structure that requires surgical correction.  Nose and throat surgeries often enlarge the airway to prevent collapse.  Most of these surgeries create pain for 1-2 weeks and up to half of the most common surgeries are not effective throughout life.  You should carefully discuss the benefits and drawbacks to surgery with your sleep provider and surgeon to determine if it is the best solution for you.   More information  Surgery for ROBERT is directed at areas that are responsible for narrowing or complete obstruction of the airway during sleep.  There are a wide range of procedures available to enlarge and/or stabilize the airway to prevent blockage of breathing in the three major areas where it can occur: the palate, tongue, and nasal regions.  Successful surgical treatment depends on the accurate identification of the factors responsible for obstructive sleep apnea in each person.  A personalized approach is required because there is no single treatment that works well for everyone.  Because of anatomic variation, consultation with an examination by a sleep surgeon is a critical first step in determining what surgical options are best for each patient.  In some cases, examination during sedation may be recommended in order to guide the selection of procedures.  Patients will be counseled about risks and benefits as well as the typical recovery course after surgery. Surgery is typically not a cure for a person s ROBERT.   However, surgery will often significantly improve one s ROBERT severity (termed  success rate ).  Even in the absence of a cure, surgery will decrease the cardiovascular risk associated with OSA7; improve overall quality of life8 (sleepiness, functionality, sleep quality, etc).      Palate Procedures:  Patients with ROBERT often have narrowing of their airway in the region of their tonsils and uvula.  The goals of palate procedures are to widen the airway in this region as well as to help the tissues resist collapse.  Modern palate procedure techniques focus on tissue conservation and soft tissue rearrangement, rather than tissue removal.  Often the uvula is preserved in this procedure. Residual sleep apnea is common in patient after pharyngoplasty with an average reduction in sleep apnea events of 33%2.      Tongue Procedures:  ExamWhile patients are awake, the muscles that surround the throat are active and keep this region open for breathing. These muscles relax during sleep, allowing the tongue and other structures to collapse and block breathing.  There are several different tongue procedures available.  Selection of a tongue base procedure depends on characteristics seen on physical exam.  Generally, procedures are aimed at removing bulky tissues in this area or preventing the back of the tongue from falling back during sleep.  Success rates for tongue surgery range from 50-62%3.    Hypoglossal Nerve Stimulation:  Hypoglossal nerve stimulation has recently received approval from the [place] Food and Drug Administration for the treatment of obstructive sleep apnea.  This is based on research showing that the system was safe and effective in treating sleep apnea6.  Results showed that the median AHI score decreased 68%, from 29.3 to 9.0. This therapy uses an implant system that senses breathing patterns and delivers mild stimulation to airway muscles, which keeps the airway open during sleep.  The system  consists of three fully implanted components: a small generator (similar in size to a pacemaker), a breathing sensor, and a stimulation lead.  Using a small handheld remote, a patient turns the therapy on before bed and off upon awakening.    Candidates for this device must be greater than 18 years of age, have moderate to severe obstructive sleep apnea with less than 25% central events  (AHI between 15-65), BMI less than 35, have tried CPAP/oral appliance for at least 8 weeks without success, and have appropriate upper airway anatomy (determined by a sleep endoscopy performed by Dr. Remy Nunn or Dr. Sanjay Abdi).    Nasal Procedures:  Nasal obstruction can interfere with nasal breathing during the day and night.  Studies have shown that relief of nasal obstruction can improve the ability of some patients to tolerate positive airway pressure therapy for obstructive sleep apnea1.  Treatment options include medications such as nasal saline, topical corticosteroid and antihistamine sprays, and oral medications such as antihistamines or decongestants. Non-surgical treatments can include external nasal dilators for selected patients. If these are not successful by themselves, surgery can improve the nasal airway either alone or in combination with these other options.        Combination Procedures:  Combination of surgical procedures and other treatments may be recommended, particularly if patients have more than one area of narrowing or persistent positional disease.  The success rate of combination surgery ranges from 66-80%2,3.    References  Elizabeth JOHNSON. The Role of the Nose in Snoring and Obstructive Sleep Apnoea: An Update.  Eur Arch Otorhinolaryngol. 2011; 268: 1365-73.   Cindy SM; Nikky JA; Diamond JR; Pallanch JF; Sandhya SWAN; Ceci SG; Jennifer LEONARD. Surgical modifications of the upper airway for obstructive sleep apnea in adults: a systematic review and meta-analysis. SLEEP 2010;33(10):1954-7382. Dov BATES  Hypopharyngeal surgery in obstructive sleep apnea: an evidence-based medicine review.  Arch Otolaryngol Head Neck Surg. 2006 Feb;132(2):206-13.  Phillip YH1, Janna Y, Francisco RANJEET. The efficacy of anatomically based multilevel surgery for obstructive sleep apnea. Otolaryngol Head Neck Surg. 2003 Oct;129(4):327-35.  Dov ECKERT, Goldberg A. Hypopharyngeal Surgery in Obstructive Sleep Apnea: An Evidence-Based Medicine Review. Arch Otolaryngol Head Neck Surg. 2006 Feb;132(2):206-13.  Galen POOLE et al. Upper-Airway Stimulation for Obstructive Sleep Apnea.  N Engl J Med. 2014 Jan 9;370(2):139-49.  Maribell Y et al. Increased Incidence of Cardiovascular Disease in Middle-aged Men with Obstructive Sleep Apnea. Am J Respir Crit Care Med; 2002 166: 159-165  Estevez EM et al. Studying Life Effects and Effectiveness of Palatopharyngoplasty (SLEEP) study: Subjective Outcomes of Isolated Uvulopalatopharyngoplasty. Otolaryngol Head Neck Surg. 2011; 144: 623-631.        WHAT IF I ONLY HAVE SNORING?    Mandibular advancement devices, lateral sleep positioning, long-term weight loss and treatment of nasal allergies have been shown to improve snoring.  Exercising tongue muscles with a game (https://[employer].Latest Medical/us/levi/Stamp.it-reduce-snoring/ix5785160734) or stimulating the tongue during the day with a device (https://doi.org/10.3390/ibk40234766) have improved snoring in some individuals.  https://www.[employer].PrairieSmarts/  https://www.sleepfoundation.org/best-anti-snoring-mouthpieces-and-mouthguards    Remember to Drive Safe... Drive Alive     Sleep health profoundly affects your health, mood, and your safety.  Thirty three percent of the population (one in three of us) is not getting enough sleep and many have a sleep disorder. Not getting enough sleep or having an untreated / undertreated sleep condition may make us sleepy without even knowing it. In fact, our driving could be dramatically impaired due to our sleep health. As your provider, here  are some things I would like you to know about driving:     Here are some warning signs for impairment and dangerous drowsy driving:              -Having been awake more than 16 hours               -Looking tired               -Eyelid drooping              -Head nodding (it could be too late at this point)              -Driving for more than 30 minutes     Some things you could do to make the driving safer if you are experiencing some drowsiness:              -Stop driving and rest              -Call for transportation              -Make sure your sleep disorder is adequately treated     Some things that have been shown NOT to work when experiencing drowsiness while driving:              -Turning on the radio              -Opening windows              -Eating any  distracting  /  entertaining  foods (e.g., sunflower seeds, candy, or any other)              -Talking on the phone      Your decision may not only impact your life, but also the life of others. Please, remember to drive safe for yourself and all of us.

## 2024-07-30 ENCOUNTER — MYC MEDICAL ADVICE (OUTPATIENT)
Dept: FAMILY MEDICINE | Facility: CLINIC | Age: 34
End: 2024-07-30
Payer: COMMERCIAL

## 2024-07-30 NOTE — TELEPHONE ENCOUNTER
Dr Up  You pended an order for CT calcium screening and the pt isnt able to schedule it until it is signed.     Thank you  Magdiel Richard RN

## 2024-08-06 ENCOUNTER — TELEPHONE (OUTPATIENT)
Dept: FAMILY MEDICINE | Facility: CLINIC | Age: 34
End: 2024-08-06

## 2024-08-06 ENCOUNTER — ALLIED HEALTH/NURSE VISIT (OUTPATIENT)
Dept: FAMILY MEDICINE | Facility: CLINIC | Age: 34
End: 2024-08-06
Payer: COMMERCIAL

## 2024-08-06 VITALS — HEART RATE: 72 BPM | DIASTOLIC BLOOD PRESSURE: 78 MMHG | SYSTOLIC BLOOD PRESSURE: 132 MMHG

## 2024-08-06 DIAGNOSIS — I10 UNCONTROLLED HYPERTENSION: Primary | ICD-10-CM

## 2024-08-06 PROCEDURE — 99207 PR NO CHARGE NURSE ONLY: CPT

## 2024-08-06 NOTE — NURSING NOTE
Sheldon Velez is a 34 year old year old patient who comes in today for a Blood Pressure check because of new medication. Started on lisinopril 5mg daily on 7/15/24.  Vital Signs as repeated by RN: BP- 144/80, P- 72. Rechecked after 3 minutes: 132/78.  Patient is taking medication as prescribed  Patient is tolerating medications well.  Patient is monitoring Blood Pressure at home, says that he checked it this morning; thinks it was 133/76.  Current complaints: Since starting lisinopril, notices that his feet are cold at night when he takes socks off and gets ready for bed. Disposition: Routing to ordering provider for review/recommendation.    Damaris Shell RN  [facility]

## 2024-08-07 NOTE — TELEPHONE ENCOUNTER
Continue monitoring blood pressure outside clinic.    Follow-up with PCP for further concerns/medication questions.  Jacquie Rosas, DNP    
Pt was notified; says that he'll wait to discuss medication question at next visit if no improvement. Writer also assisted pt with request to schedule follow up visit in November for labs, BP, and to discuss results of coronary calcium CT scan.    Damaris Shell RN  [facility]  
Sheldon Velez is a 34 year old year old patient who comes in today for a Blood Pressure check because of new medication. Started on lisinopril 5mg daily on 7/15/24.  Vital Signs as repeated by RN: BP- 144/80, P- 72. Rechecked after 3 minutes: 132/78.  Patient is taking medication as prescribed  Patient is tolerating medications well.  Patient is monitoring Blood Pressure at home, says that he checked it this morning; thinks it was 133/76.  Current complaints: Since starting lisinopril, notices that his feet are cold at night when he takes socks off and gets ready for bed. Disposition: Routing to ordering provider for review/recommendation.     Damaris Shell RN  [facility]     
95

## 2024-08-29 ENCOUNTER — HOSPITAL ENCOUNTER (OUTPATIENT)
Dept: CT IMAGING | Facility: CLINIC | Age: 34
Discharge: HOME | End: 2024-08-29
Attending: FAMILY MEDICINE | Admitting: FAMILY MEDICINE
Payer: COMMERCIAL

## 2024-08-29 DIAGNOSIS — Z82.49 FAMILY HISTORY OF HEART DISEASE: ICD-10-CM

## 2024-08-29 DIAGNOSIS — E78.2 MIXED HYPERLIPIDEMIA: ICD-10-CM

## 2024-08-29 DIAGNOSIS — I10 UNCONTROLLED HYPERTENSION: ICD-10-CM

## 2024-08-29 DIAGNOSIS — J84.10 GRANULOMATOUS LUNG DISEASE (H): Primary | ICD-10-CM

## 2024-08-29 PROCEDURE — 75571 CT HRT W/O DYE W/CA TEST: CPT | Mod: 26 | Performed by: INTERNAL MEDICINE

## 2024-08-29 PROCEDURE — 75571 CT HRT W/O DYE W/CA TEST: CPT

## 2024-09-10 ENCOUNTER — OFFICE VISIT (OUTPATIENT)
Dept: SLEEP MEDICINE | Facility: CLINIC | Age: 34
End: 2024-09-10
Payer: COMMERCIAL

## 2024-09-10 DIAGNOSIS — R06.81 WITNESSED EPISODE OF APNEA: ICD-10-CM

## 2024-09-10 DIAGNOSIS — R06.83 SNORING: ICD-10-CM

## 2024-09-10 PROCEDURE — 95800 SLP STDY UNATTENDED: CPT

## 2024-09-11 ENCOUNTER — DOCUMENTATION ONLY (OUTPATIENT)
Dept: SLEEP MEDICINE | Facility: CLINIC | Age: 34
End: 2024-09-11
Payer: COMMERCIAL

## 2024-09-16 NOTE — PROGRESS NOTES
This HSAT was performed using a Noxturnal T3 device which recorded snore, sound, movement activity, body position, nasal pressure, oronasal thermal airflow, pulse, oximetry and both chest and abdominal respiratory effort. HSAT data was restricted to the time patient states they were in bed.     HSAT was scored using 1B 4% hypopnea rule.     AHI: 18.2.  Snoring was reported as loud.  Time with SpO2 below 89% was 9.6 minutes.   Overall signal quality was good     Pt will follow up with sleep provider to determine appropriate therapy.     Ordering Provider, Carolina López MD C. Oyugi, BA, RPSGT, RST System Clinical Specialist/ 9/16/2024

## 2024-09-17 ENCOUNTER — MYC MEDICAL ADVICE (OUTPATIENT)
Dept: SLEEP MEDICINE | Facility: CLINIC | Age: 34
End: 2024-09-17
Payer: COMMERCIAL

## 2024-09-17 DIAGNOSIS — G47.33 OSA (OBSTRUCTIVE SLEEP APNEA): Primary | ICD-10-CM

## 2024-09-17 DIAGNOSIS — J84.10 GRANULOMATOUS LUNG DISEASE (H): Primary | ICD-10-CM

## 2024-09-17 NOTE — PROGRESS NOTES
HST POST-STUDY QUESTIONNAIRE    What time did you go to bed?  22:30  How long do you think it took to fall asleep?  1.5 hours  What time did you wake up to start the day?  05:50  Did you get up during the night at all?  no  If you woke up, do you remember approximately what time(s)?   Did you have any difficulty with the equipment?  no  Did you us any type of treatment with this study?  none  Was the head of the bed elevated? No  Did you sleep in a recliner?  No  Did you stop using CPAP at least 3 days before this test?  NA  Any other information you'd like us to know?

## 2024-09-17 NOTE — PROCEDURES
"HOME SLEEP STUDY INTERPRETATION        Patient: Sheldon Velez  MRN: [id number]  YOB: 1990  Study Date: 9/10/2024  PCP/Referring Provider: No Ref-Primary, Physician;   Ordering Provider: [unfilled]         Indications for Home Study: Sheldon Velez is a 34 year old male with a history of hypertension, class II obesity who presents with symptoms suggestive of obstructive sleep apnea.    Estimated body mass index is 34.45 kg/m  as calculated from the following:    Height as of 24: 1.93 m (6' 4\").    Weight as of 24: 128.4 kg (283 lb).  [place] Sleepiness Scale:   STOP-BAN/8        Data: A full night home sleep study was performed recording the standard physiologic parameters including body position, movement, sound, nasal pressure, thermal oral airflow, chest and abdominal movements with respiratory inductance plethysmography, and oxygen saturation by pulse oximetry. Pulse rate was estimated by oximetry recording. This study was considered adequate based on > 4 hours of quality oximetry and respiratory recording. As specified by the AASM Manual for the Scoring of Sleep and Associated events, version 2.3, Rule VIII.D 1B, 4% oxygen desaturation scoring for hypopneas is used as a standard of care on all home sleep apnea testing.        Analysis Time:  415.3 minutes        Respiration:   Sleep Associated Hypoxemia: sustained hypoxemia was present. Baseline oxygen saturation was 94%.  Time with saturation less than or equal to 88% was 9.6 minutes. The lowest oxygen saturation was 81%.   Snoring: Snoring was present.  Respiratory events: The home study revealed a presence of 52 obstructive apneas and 7 mixed and central apneas. There were 35 hypopneas resulting in a combined apnea/hypopnea index [AHI] of 18.2 events per hour.  AHI was 78.5 per hour supine, 1.1 per hour prone, 4.4 per hour on left side, and 8.3 per hour on right side.   Pattern: Excluding events noted above, " respiratory rate and pattern was Normal.      Position: Percent of time spent: supine - 17.3%, prone - 13%, on left - 32.9%, on right - 36.8%.      Heart Rate: By pulse oximetry normal rate was noted.       Assessment:   Moderate obstructive sleep apnea.  Sleep associated hypoxemia was present.    Recommendations:  Consider auto-CPAP at 5-15 cmH2O, oral appliance therapy, or positional therapy.  Suggest optimizing sleep hygiene and avoiding sleep deprivation.  Weight management.        Diagnosis Code(s): Obstructive Sleep Apnea G47.33, Hypoxemia G47.36, Snoring R06.83    Electronically signed by: Rene Figueroa MD, September 17, 2024   Diplomate, American Board of Internal Medicine, Sleep Medicine

## 2024-09-25 ENCOUNTER — MYC REFILL (OUTPATIENT)
Dept: FAMILY MEDICINE | Facility: CLINIC | Age: 34
End: 2024-09-25
Payer: COMMERCIAL

## 2024-09-25 DIAGNOSIS — I10 UNCONTROLLED HYPERTENSION: ICD-10-CM

## 2024-09-26 RX ORDER — LISINOPRIL 5 MG/1
5 TABLET ORAL DAILY
Qty: 90 TABLET | Refills: 1 | Status: SHIPPED | OUTPATIENT
Start: 2024-09-26

## 2024-10-02 ENCOUNTER — DOCUMENTATION ONLY (OUTPATIENT)
Dept: SLEEP MEDICINE | Facility: CLINIC | Age: 34
End: 2024-10-02
Payer: COMMERCIAL

## 2024-10-02 DIAGNOSIS — G47.33 OSA (OBSTRUCTIVE SLEEP APNEA): Primary | ICD-10-CM

## 2024-10-02 NOTE — PROGRESS NOTES
Patient was offered choice of vendor and chose [facility].  Patient Sehldon Velez was set up at [place]  on October 2, 2024. Patient received a Resmed Airsense 11 Pressures were set at  5-15 cm H2O.   Patient s ramp is 5 cm H2O for Auto and FLEX/EPR is EPR, 2.  Patient received a Resmed Mask name: AIRFIT N20  Nasal mask size Medium, heated tubing and heated humidifier.  Patient has the following compliance requirements: usage only  Patient has a follow up on 12/2/24 with sleep fellow .    Lexie Kumar

## 2024-10-07 ENCOUNTER — DOCUMENTATION ONLY (OUTPATIENT)
Dept: SLEEP MEDICINE | Facility: CLINIC | Age: 34
End: 2024-10-07
Payer: COMMERCIAL

## 2024-10-07 NOTE — PROGRESS NOTES
3 day Sleep therapy management telephone visit    Diagnostic AHI:    HST: 18.2        SUBJECTIVE: Patient reports doing well with CPAP. Patient denies any questions or concerns. Patient was given my contact information and instructed to reach out with any questions or concerns.       Order settings:  CPAP MIN CPAP MAX   5 cm H2O 15 cm H2O         Device settings:  CPAP MIN CPAP MAX EPR RESMED SOFT RESPONSE SETTING   5.0 cm  H20 15.0 cm  H20 TWO OFF         Patient has the following upcoming sleep appts:  Future Sleep Appointments         Provider Department    12/2/2024 4:00 PM (Arrive by 3:45 PM) Carolina López MD [facility]            Replacement device: No  STM ordered by provider: Yes     Total time spent on accessing and  interpreting remote patient PAP therapy data  10 minutes    Total time spent counseling, coaching  and reviewing PAP therapy data with patient  1 minutes

## 2024-10-10 ENCOUNTER — OFFICE VISIT (OUTPATIENT)
Dept: PULMONOLOGY | Facility: CLINIC | Age: 34
End: 2024-10-10
Attending: FAMILY MEDICINE
Payer: COMMERCIAL

## 2024-10-10 ENCOUNTER — ANCILLARY PROCEDURE (OUTPATIENT)
Dept: GENERAL RADIOLOGY | Facility: CLINIC | Age: 34
End: 2024-10-10
Attending: STUDENT IN AN ORGANIZED HEALTH CARE EDUCATION/TRAINING PROGRAM
Payer: COMMERCIAL

## 2024-10-10 ENCOUNTER — HOSPITAL ENCOUNTER (OUTPATIENT)
Dept: RESPIRATORY THERAPY | Facility: CLINIC | Age: 34
Discharge: HOME | End: 2024-10-10
Attending: PHYSICIAN ASSISTANT | Admitting: PHYSICIAN ASSISTANT
Payer: COMMERCIAL

## 2024-10-10 VITALS
DIASTOLIC BLOOD PRESSURE: 82 MMHG | HEIGHT: 76 IN | TEMPERATURE: 98.4 F | OXYGEN SATURATION: 98 % | BODY MASS INDEX: 35.17 KG/M2 | SYSTOLIC BLOOD PRESSURE: 157 MMHG | HEART RATE: 75 BPM | WEIGHT: 288.8 LBS

## 2024-10-10 DIAGNOSIS — J84.10 GRANULOMATOUS LUNG DISEASE (H): ICD-10-CM

## 2024-10-10 LAB
CRP SERPL-MCNC: <3 MG/L
ERYTHROCYTE [SEDIMENTATION RATE] IN BLOOD BY WESTERGREN METHOD: 10 MM/HR (ref 0–15)

## 2024-10-10 PROCEDURE — 71046 X-RAY EXAM CHEST 2 VIEWS: CPT | Mod: TC | Performed by: RADIOLOGY

## 2024-10-10 PROCEDURE — 85652 RBC SED RATE AUTOMATED: CPT | Performed by: STUDENT IN AN ORGANIZED HEALTH CARE EDUCATION/TRAINING PROGRAM

## 2024-10-10 PROCEDURE — 271N000002 HC RX 271

## 2024-10-10 PROCEDURE — 86140 C-REACTIVE PROTEIN: CPT | Performed by: STUDENT IN AN ORGANIZED HEALTH CARE EDUCATION/TRAINING PROGRAM

## 2024-10-10 PROCEDURE — 94726 PLETHYSMOGRAPHY LUNG VOLUMES: CPT

## 2024-10-10 PROCEDURE — 99215 OFFICE O/P EST HI 40 MIN: CPT | Mod: 25 | Performed by: STUDENT IN AN ORGANIZED HEALTH CARE EDUCATION/TRAINING PROGRAM

## 2024-10-10 PROCEDURE — 99000 SPECIMEN HANDLING OFFICE-LAB: CPT | Performed by: STUDENT IN AN ORGANIZED HEALTH CARE EDUCATION/TRAINING PROGRAM

## 2024-10-10 PROCEDURE — 87449 NOS EACH ORGANISM AG IA: CPT | Mod: 90 | Performed by: STUDENT IN AN ORGANIZED HEALTH CARE EDUCATION/TRAINING PROGRAM

## 2024-10-10 PROCEDURE — 86698 HISTOPLASMA ANTIBODY: CPT | Mod: 90 | Performed by: STUDENT IN AN ORGANIZED HEALTH CARE EDUCATION/TRAINING PROGRAM

## 2024-10-10 PROCEDURE — 86612 BLASTOMYCES ANTIBODY: CPT | Mod: 90 | Performed by: STUDENT IN AN ORGANIZED HEALTH CARE EDUCATION/TRAINING PROGRAM

## 2024-10-10 PROCEDURE — 94729 DIFFUSING CAPACITY: CPT

## 2024-10-10 PROCEDURE — 94060 EVALUATION OF WHEEZING: CPT

## 2024-10-10 PROCEDURE — 86635 COCCIDIOIDES ANTIBODY: CPT | Mod: 90 | Performed by: STUDENT IN AN ORGANIZED HEALTH CARE EDUCATION/TRAINING PROGRAM

## 2024-10-10 PROCEDURE — 36415 COLL VENOUS BLD VENIPUNCTURE: CPT | Performed by: STUDENT IN AN ORGANIZED HEALTH CARE EDUCATION/TRAINING PROGRAM

## 2024-10-10 PROCEDURE — 87385 HISTOPLASMA CAPSUL AG IA: CPT | Mod: 90 | Performed by: STUDENT IN AN ORGANIZED HEALTH CARE EDUCATION/TRAINING PROGRAM

## 2024-10-10 PROCEDURE — 86606 ASPERGILLUS ANTIBODY: CPT | Mod: 90 | Performed by: STUDENT IN AN ORGANIZED HEALTH CARE EDUCATION/TRAINING PROGRAM

## 2024-10-10 RX ORDER — INHALER, ASSIST DEVICES
1 SPACER (EA) MISCELLANEOUS ONCE
Status: COMPLETED | OUTPATIENT
Start: 2024-10-10 | End: 2024-10-10

## 2024-10-10 RX ADMIN — Medication 1 EACH: at 13:23

## 2024-10-10 NOTE — PATIENT INSTRUCTIONS
Labs today to look for signs of prior fungal exposure.    Assuming tests negative, can continue to monitor for any respiratory symptoms and follow up in Pulmonary as needed.     I will message Dr. Up, may consider CXR next year for monitoring.     Can get flu and COVID shots.

## 2024-10-10 NOTE — NURSING NOTE
"Initial BP (!) 157/82 (BP Location: Right arm, Patient Position: Sitting, Cuff Size: Adult Regular)   Pulse 75   Temp 98.4  F (36.9  C) (Tympanic)   Ht 1.93 m (6' 3.98\")   Wt 131 kg (288 lb 12.8 oz)   SpO2 98%   BMI 35.17 kg/m   Estimated body mass index is 35.17 kg/m  as calculated from the following:    Height as of this encounter: 1.93 m (6' 3.98\").    Weight as of this encounter: 131 kg (288 lb 12.8 oz). .  Yancy Kramer MA    "

## 2024-10-10 NOTE — PROGRESS NOTES
[facility]    Pulmonary, Allergy, Critical Care and Sleep Medicine    Initial Clinic Visit  10/10/24    Assessment and Recommendations:    Sheldon Velez is a 34 year old male with a history of HTN and ROBERT recently started on CPAP who presents for evaluation of granulomatous lung disease.     Evidence of granulomatous disease on CT  CT calcium screening this summer with calcified granuloma and bilateral calcified hilar and mediastinal lymph nodes. Not commented on but by personal review possibly some calcifications in spleen. Possible sequale of prior fungal exposure. Did grow up in [place] and has spent plenty of time outside. Also considered possibility of sarcoid. Thorough history without significant pulmonary symptoms or any extra-pulmonary symptoms that would be consistent with sarcoid. PFTs are normal, CXR without hilar adenopathy or parenchymal changes. Will send off inflammatory markers and endemic fungal testing. Discussed that evidence of prior exposure would not necessarily mean treatment. At this time would likely be appropriate to monitor for any development of respiratory symptoms, can consider repeat imaging in a year. Would recommend staying up to date with vaccinations, discussed today including flu and COVID.     Rosalva Mata MD PhD   of Medicine  Pulmonary Critical Care   [facility]    40 minutes spent on the date of the encounter doing chart review, history and exam, documentation and further activities per the note.    Approximately 35 minutes of non face-to-face time were spent in review of the patient's medical record on 10/9/24.  This included review of previous: clinic visits, hospital records, lab results, imaging studies, and procedural documentation.  The findings from this review are summarized in the above note.    History of Present Illness:    HPI:   Seen by Pulmonary/Sleep in July 2024 for snoring, poor sleep, daytime  tiredness in setting of difficult to control hypertension. Concern for ROBERT and plan for home sleep study (9/10/24) which did show moderate ROBERT with sleep associated hypoxia. Plan made to consider CPAP. Underwent CT calcium screening in August which showed partially calcified granuloma in RLL.     Today reports that day to day has minimal symptoms, clears throat a lot. No shortness of breath or cough, no wheezing. No hemoptysis. No chest pain. No fevers, chills, night sweats, frequent illness. No painful adenopathy. No fatigue or weight loss. No skin rashes, occasional joint pain in knees and ankles. No vision issues other than glasses, no numbness or tingling in body. No palpitations or ????o??. No reflux or post nasal drip. Breathing does not limit activity. No triggers in environment. Just started a CPAP about a week ago, going ok, often takes off about 3 am but last night made it through night.     No family history of lung or auto-immune disease. 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 A little exposure to fumes, dust, not a lot. Is part of a machine shop. Former smoker, quit 3 years ago, started 16 (about 15 yrs total, about 1/2 ppd). No vaping, no marijuana. Use to have cat, no current pets. Grew up in [facility]. Was out in the woods a lot. Not too much camping, fishing recently. No known bat or cave exposure. No exposure to rotting wood, old stumps. Has traveled to [place] and [place]. No known mold or water damage. No known TB exposure.     Additional data from chart review  Pulmonary and Primary Care notes reviewed and summarized as above.    Procedures  Home Sleep Apnea Test 9/10/24  Respiration:   Sleep Associated Hypoxemia: sustained hypoxemia was present. Baseline oxygen saturation was 94%.  Time with saturation less than or equal to 88% was 9.6 minutes. The lowest oxygen saturation was 81%.   Snoring: Snoring was present.  Respiratory  events: The home study revealed a presence of 52 obstructive apneas and 7 mixed and central apneas. There were 35 hypopneas resulting in a combined apnea/hypopnea index [AHI] of 18.2 events per hour.  AHI was 78.5 per hour supine, 1.1 per hour prone, 4.4 per hour on left side, and 8.3 per hour on right side.   Pattern: Excluding events noted above, respiratory rate and pattern was Normal.    Assessment:   Moderate obstructive sleep apnea.  Sleep associated hypoxemia was present.    PFTs 10/10/24: Personally reviewed, normal spirometry, lung volumes, DLCO    Imaging  CT Calcium 8/29/24: Personally reviewed, calcified mediastinal and hilar lymph nodes, calcified granuloma in right lower lobe    CXR 10/10/24: Personally reviewed, no opacities or effusions, no hilar adenopathy    Review of Systems:  Complete 12 point ROS negative unless mentioned in HPI    Histories, Medications, Allergies:    Past Medical History:  Hypertension  ROBERT    Past Surgical History:  No past surgical history on file.  Past Social History:  Social History     Socioeconomic History    Marital status:      Spouse name: Not on file    Number of children: Not on file    Years of education: Not on file    Highest education level: Not on file   Occupational History    Not on file   Tobacco Use    Smoking status: Former     Types: Cigarettes    Smokeless tobacco: Current    Tobacco comments:     Zyn pouches   Vaping Use    Vaping status: Never Used   Substance and Sexual Activity    Alcohol use: Not on file    Drug use: Not on file    Sexual activity: Not on file   Other Topics Concern    Not on file   Social History Narrative    Not on file     Social Determinants of Health     Financial Resource Strain: Low Risk  (7/1/2024)    Financial Resource Strain     Within the past 12 months, have you or your family members you live with been unable to get utilities (heat, electricity) when it was really needed?: No   Food Insecurity: Low Risk   (7/1/2024)    Food Insecurity     Within the past 12 months, did you worry that your food would run out before you got money to buy more?: No     Within the past 12 months, did the food you bought just not last and you didn t have money to get more?: No   Transportation Needs: Low Risk  (7/1/2024)    Transportation Needs     Within the past 12 months, has lack of transportation kept you from medical appointments, getting your medicines, non-medical meetings or appointments, work, or from getting things that you need?: No   Physical Activity: Unknown (7/1/2024)    Exercise Vital Sign     Days of Exercise per Week: 2 days     Minutes of Exercise per Session: Not on file   Stress: No Stress Concern Present (7/1/2024)    [place] [employer] Occupational [employer] - Occupational Stress Questionnaire     Feeling of Stress : Only a little   Social Connections: Unknown (7/1/2024)    Social Connection and Isolation Panel [NHANES]     Frequency of Communication with Friends and Family: Not on file     Frequency of Social Gatherings with Friends and Family: Once a week     Attends Ep[employer]alian Services: Not on file     Active Member of Clubs or Organizations: Not on file     Attends Club or Organization Meetings: Not on file     Marital Status: Not on file   Interpersonal Safety: Low Risk  (7/1/2024)    Interpersonal Safety     Do you feel physically and emotionally safe where you currently live?: Yes     Within the past 12 months, have you been hit, slapped, kicked or otherwise physically hurt by someone?: No     Within the past 12 months, have you been humiliated or emotionally abused in other ways by your partner or ex-partner?: No   Housing Stability: Low Risk  (7/1/2024)    Housing Stability     Do you have housing? : Yes     Are you worried about losing your housing?: No     Family History:  No family history of lung or auto-immune disease    Medications:  Current Outpatient Medications   Medication Sig Dispense Refill     "lisinopril (ZESTRIL) 5 MG tablet Take 1 tablet (5 mg) by mouth daily. 90 tablet 1     No current facility-administered medications for this visit.     No current facility-administered medications for this visit.     No current facility-administered medications for this visit.     Allergies:   No Known Allergies  Physical Exam:       BP (!) 157/82 (BP Location: Right arm, Patient Position: Sitting, Cuff Size: Adult Regular)   Pulse 75   Temp 98.4  F (36.9  C) (Tympanic)   Ht 1.93 m (6' 3.98\")   Wt 131 kg (288 lb 12.8 oz)   SpO2 98%   BMI 35.17 kg/m      General: Sitting up in NAD  HEENT: anicteric, moist mucosa  Neck: no palpable lymphadenopathy  Chest: CTAB, no wheezing or crackles  Cardiac: RRR no murmurs, radial pulses intact  Abdomen: Soft, non tender, active BS  Extremities: No LE Edema  Neuro: A&Ox3, no focal deficits   Skin: no rash noted on limited exam  Psych: Appropriate  Laboratory, imaging, and microbiologic data:    All laboratory and imaging data reviewed, pertinent results discussed above.    "

## 2024-10-11 LAB
DLCOUNC-%PRED-PRE: 122 %
DLCOUNC-PRE: 46.57 ML/MIN/MMHG
DLCOUNC-PRED: 37.98 ML/MIN/MMHG
ERV-%PRED-PRE: 62 %
ERV-PRE: 1.33 L
ERV-PRED: 2.13 L
EXPTIME-PRE: 9.28 SEC
FEF2575-%PRED-POST: 112 %
FEF2575-%PRED-PRE: 78 %
FEF2575-POST: 5.32 L/SEC
FEF2575-PRE: 3.7 L/SEC
FEF2575-PRED: 4.74 L/SEC
FEFMAX-%PRED-PRE: 79 %
FEFMAX-PRE: 9.22 L/SEC
FEFMAX-PRED: 11.66 L/SEC
FEV1-%PRED-PRE: 96 %
FEV1-PRE: 4.72 L
FEV1FEV6-PRE: 74 %
FEV1FEV6-PRED: 83 %
FEV1FVC-PRE: 74 %
FEV1FVC-PRED: 82 %
FEV1SVC-PRE: 71 %
FEV1SVC-PRED: 84 %
FIFMAX-PRE: 3.9 L/SEC
FRCPLETH-%PRED-PRE: 100 %
FRCPLETH-PRE: 3.75 L
FRCPLETH-PRED: 3.74 L
FVC-%PRED-PRE: 106 %
FVC-PRE: 6.41 L
FVC-PRED: 6.02 L
IC-%PRED-PRE: 124 %
IC-PRE: 5.32 L
IC-PRED: 4.26 L
RVPLETH-%PRED-PRE: 117 %
RVPLETH-PRE: 2.41 L
RVPLETH-PRED: 2.06 L
TLCPLETH-%PRED-PRE: 108 %
TLCPLETH-PRE: 9.06 L
TLCPLETH-PRED: 8.34 L
VA-%PRED-PRE: 104 %
VA-PRE: 8.48 L
VC-%PRED-PRE: 114 %
VC-PRE: 6.65 L
VC-PRED: 5.83 L

## 2024-10-13 LAB
H CAPSUL AG UR QL IA: NOT DETECTED
H CAPSUL AG UR-MCNC: NOT DETECTED NG/ML

## 2024-10-14 LAB
ASPERGILLUS AB TITR SER CF: NORMAL {TITER}
B DERMAT AB SER-ACNC: 0.8 IV
COCCIDIOIDES AB TITR SER CF: NORMAL {TITER}
H CAPSUL AG SER IA-ACNC: NOT DETECTED
H CAPSUL AG SER QL IA: NOT DETECTED
H CAPSUL MYC AB TITR SER CF: NORMAL {TITER}
H CAPSUL YST AB TITR SER CF: NORMAL {TITER}
SCANNED LAB RESULT: NORMAL

## 2024-10-17 ENCOUNTER — DOCUMENTATION ONLY (OUTPATIENT)
Dept: SLEEP MEDICINE | Facility: CLINIC | Age: 34
End: 2024-10-17
Payer: COMMERCIAL

## 2024-10-17 NOTE — PROGRESS NOTES
14 day Sleep therapy management telephone visit    Diagnostic AHI:    HST: 18.2        LEFT VOICE MESSAGE FOR PATIENT TO RETURN CALL      Objective measures: 14 day rolling measures   COMPLIANCE LEAK AHI AVERAGE USE IN MINUTES   57 % 20.96 1.75 302   GOAL >70% GOAL < 24 LPM GOAL <5 GOAL >240          Device settings:  CPAP MIN CPAP MAX EPR RESMED SOFT RESPONSE SETTING   5.0 cm  H20 15.0 cm  H20 TWO OFF         Patient has the following upcoming sleep appts:  Future Sleep Appointments         Provider Department    12/2/2024 4:00 PM (Arrive by 3:45 PM) Carolina López MD [facility] [place]            Replacement device: No  STM ordered by provider: Yes     Total time spent on accessing and  interpreting remote patient PAP therapy data  10 minutes    Total time spent counseling, coaching  and reviewing PAP therapy data with patient  1 minutes

## 2025-04-02 DIAGNOSIS — I10 UNCONTROLLED HYPERTENSION: ICD-10-CM

## 2025-04-02 RX ORDER — LISINOPRIL 5 MG/1
5 TABLET ORAL DAILY
Qty: 30 TABLET | Refills: 0 | Status: SHIPPED | OUTPATIENT
Start: 2025-04-02

## 2025-04-02 NOTE — TELEPHONE ENCOUNTER
Requested Prescriptions   Pending Prescriptions Disp Refills    lisinopril (ZESTRIL) 5 MG tablet [Pharmacy Med Name: LISINOPRIL 5 MG TABLET] 90 tablet 1     Sig: TAKE 1 TABLET BY MOUTH EVERY DAY       ACE Inhibitors (Including Combos) Protocol Failed - 4/2/2025 11:16 AM        Failed - Most recent blood pressure under 140/90 in past 12 months- Clinicial or Patient Reported     BP Readings from Last 3 Encounters:   10/10/24 (!) 157/82   08/06/24 132/78   07/18/24 (!) 144/81       No data recorded            Passed - Medication is active on med list and the sig matches. RN to manually verify dose and sig if red X/fail.     If the protocol passes (green check), you do not need to verify med dose and sig.    A prescription matches if they are the same clinical intention.    For Example: once daily and every morning are the same.    The protocol can not identify upper and lower case letters as matching and will fail.     For Example: Take 1 tablet (50 mg) by mouth daily     TAKE 1 TABLET (50 MG) BY MOUTH DAILY    For all fails (red x), verify dose and sig.    If the refill does match what is on file, the RN can still proceed to approve the refill request.       If they do not match, route to the appropriate provider.             Passed - Medication indicated for associated diagnosis     Medication is associated with one or more of the following diagnoses:     Chronic Kidney Disease (CKD)   Coronary Artery Disease (CAD)   Diabetes   Heart Failure (HF)   Hypertension (HTN)   Nephropathy   History of myocarditis   Tachycardia induced cardiomyopathy   STEMI (ST elevation myocardial infarction)   Spontaneous dissection of coronary artery   Status post percutaneous transluminal coronary angioplasty            Passed - Recent (12 mo) or future (90 days) visit within the authorizing provider's specialty     The patient must have completed an in-person or virtual visit within the past 12 months or has a future visit scheduled  within the next 90 days with the authorizing provider s specialty.  Urgent care and e-visits do not qualify as an office visit for this protocol.          Passed - Most recent GFR on file in the past 12 months >30        Passed - Patient is age 18 or older        Passed - Normal serum potassium on file in past 12 months     Recent Labs   Lab Test 07/15/24  1616   POTASSIUM 4.0             Last office visit: 7/15/2024 ; last virtual visit: Visit date not found with prescribing provider:     Future Office Visit:        Julie Behrendt RN

## 2025-04-25 DIAGNOSIS — I10 UNCONTROLLED HYPERTENSION: ICD-10-CM

## 2025-04-28 RX ORDER — LISINOPRIL 5 MG/1
TABLET ORAL
Qty: 30 TABLET | Refills: 0 | Status: SHIPPED | OUTPATIENT
Start: 2025-04-28

## 2025-04-28 NOTE — TELEPHONE ENCOUNTER
Contacts       Contact Date/Time Type Contact Phone/Fax    04/25/2025 10:30 AM CDT Interface (Incoming) [facility] [id number] [facility] - [place], [place] - [address] (Pharmacy) [phone]          Attempted to reach patient to: Schedule an appointment    When patient returns call, please take this action: Assist with scheduling    Reason for the visit: Chronic Disease Management/Medication/Follow-up    When to schedule: Within 30 days    Additional comments/info: in person hypertension visit prior to any med refills    If unable to schedule: Add a note to the telephone encounter noting the barrier and route back to primary care team pool

## 2025-04-28 NOTE — TELEPHONE ENCOUNTER
Please call patient. He did not respond to [employer] message sent before.  He needs to be seen in person for hypertension management within the next 30 days before he can get additional refills.

## 2025-05-26 DIAGNOSIS — I10 UNCONTROLLED HYPERTENSION: ICD-10-CM

## 2025-05-27 RX ORDER — LISINOPRIL 5 MG/1
TABLET ORAL
Qty: 15 TABLET | Refills: 0 | Status: SHIPPED | OUTPATIENT
Start: 2025-05-27

## 2025-06-14 DIAGNOSIS — I10 UNCONTROLLED HYPERTENSION: ICD-10-CM

## 2025-06-16 RX ORDER — LISINOPRIL 5 MG/1
5 TABLET ORAL DAILY
Qty: 15 TABLET | Refills: 0 | Status: SHIPPED | OUTPATIENT
Start: 2025-06-16

## 2025-07-28 ENCOUNTER — TELEPHONE (OUTPATIENT)
Dept: FAMILY MEDICINE | Facility: CLINIC | Age: 35
End: 2025-07-28
Payer: COMMERCIAL

## 2025-07-28 NOTE — TELEPHONE ENCOUNTER
Patient Quality Outreach    Patient is due for the following:   Hypertension -  Hypertension follow-up visit  Physical Preventive Adult Physical      Topic Date Due    COVID-19 Vaccine (4 - 2024-25 season) 09/01/2024       Action(s) Taken:   Schedule a Adult Preventative    Type of outreach:    Sent [employer] message.    Questions for provider review:    None         Tootie Contreras MA  Chart routed to None.

## 2025-07-29 NOTE — TELEPHONE ENCOUNTER
Pt viewed outreach messages via [facility]. Closing [facility].   Tootie Contreras MA on 7/29/2025 at 11:04 AM

## 2025-08-10 ENCOUNTER — HEALTH MAINTENANCE LETTER (OUTPATIENT)
Age: 35
End: 2025-08-10